# Patient Record
Sex: MALE | Race: BLACK OR AFRICAN AMERICAN | NOT HISPANIC OR LATINO | Employment: FULL TIME | URBAN - METROPOLITAN AREA
[De-identification: names, ages, dates, MRNs, and addresses within clinical notes are randomized per-mention and may not be internally consistent; named-entity substitution may affect disease eponyms.]

---

## 2023-03-13 ENCOUNTER — HOSPITAL ENCOUNTER (EMERGENCY)
Facility: HOSPITAL | Age: 28
Discharge: HOME | End: 2023-03-13
Admitting: PHYSICIAN ASSISTANT
Payer: COMMERCIAL

## 2023-03-13 ENCOUNTER — APPOINTMENT (OUTPATIENT)
Dept: ULTRASOUND IMAGING | Facility: HOSPITAL | Age: 28
End: 2023-03-13
Attending: PHYSICIAN ASSISTANT
Payer: COMMERCIAL

## 2023-03-13 VITALS
TEMPERATURE: 97.6 F | BODY MASS INDEX: 23.43 KG/M2 | DIASTOLIC BLOOD PRESSURE: 80 MMHG | OXYGEN SATURATION: 100 % | WEIGHT: 168 LBS | SYSTOLIC BLOOD PRESSURE: 122 MMHG | RESPIRATION RATE: 16 BRPM | HEART RATE: 51 BPM

## 2023-03-13 DIAGNOSIS — R17 TOTAL BILIRUBIN, ELEVATED: ICD-10-CM

## 2023-03-13 DIAGNOSIS — K29.00 ACUTE GASTRITIS WITHOUT HEMORRHAGE, UNSPECIFIED GASTRITIS TYPE: ICD-10-CM

## 2023-03-13 LAB
ALBUMIN SERPL BCG-MCNC: 4.2 G/DL (ref 3.5–5.2)
ALP SERPL-CCNC: 98 U/L (ref 40–129)
ALT SERPL W P-5'-P-CCNC: 36 U/L (ref 10–50)
ANION GAP SERPL CALCULATED.3IONS-SCNC: 8 MMOL/L (ref 7–15)
AST SERPL W P-5'-P-CCNC: 21 U/L (ref 10–50)
BASOPHILS # BLD MANUAL: 0.2 10E3/UL (ref 0–0.2)
BASOPHILS NFR BLD MANUAL: 2 %
BILIRUB DIRECT SERPL-MCNC: 0.38 MG/DL (ref 0–0.3)
BILIRUB SERPL-MCNC: 2.7 MG/DL
BUN SERPL-MCNC: 7.4 MG/DL (ref 6–20)
CALCIUM SERPL-MCNC: 9 MG/DL (ref 8.6–10)
CHLORIDE SERPL-SCNC: 103 MMOL/L (ref 98–107)
CREAT SERPL-MCNC: 1.18 MG/DL (ref 0.67–1.17)
DEPRECATED HCO3 PLAS-SCNC: 26 MMOL/L (ref 22–29)
EOSINOPHIL # BLD MANUAL: 0.3 10E3/UL (ref 0–0.7)
EOSINOPHIL NFR BLD MANUAL: 3 %
ERYTHROCYTE [DISTWIDTH] IN BLOOD BY AUTOMATED COUNT: 12.7 % (ref 10–15)
GFR SERPL CREATININE-BSD FRML MDRD: 86 ML/MIN/1.73M2
GLUCOSE SERPL-MCNC: 94 MG/DL (ref 70–99)
HCT VFR BLD AUTO: 50.4 % (ref 40–53)
HGB BLD-MCNC: 18.5 G/DL (ref 13.3–17.7)
LIPASE SERPL-CCNC: 31 U/L (ref 13–60)
LYMPHOCYTES # BLD MANUAL: 2.3 10E3/UL (ref 0.8–5.3)
LYMPHOCYTES NFR BLD MANUAL: 25 %
MCH RBC QN AUTO: 33 PG (ref 26.5–33)
MCHC RBC AUTO-ENTMCNC: 36.7 G/DL (ref 31.5–36.5)
MCV RBC AUTO: 90 FL (ref 78–100)
MONOCYTES # BLD MANUAL: 0.4 10E3/UL (ref 0–1.3)
MONOCYTES NFR BLD MANUAL: 4 %
NEUTROPHILS # BLD MANUAL: 6.1 10E3/UL (ref 1.6–8.3)
NEUTROPHILS NFR BLD MANUAL: 66 %
PLAT MORPH BLD: ABNORMAL
PLATELET # BLD AUTO: 231 10E3/UL (ref 150–450)
POTASSIUM SERPL-SCNC: 4.2 MMOL/L (ref 3.4–5.3)
PROT SERPL-MCNC: 7.1 G/DL (ref 6.4–8.3)
RBC # BLD AUTO: 5.6 10E6/UL (ref 4.4–5.9)
RBC MORPH BLD: ABNORMAL
SODIUM SERPL-SCNC: 137 MMOL/L (ref 136–145)
VARIANT LYMPHS BLD QL SMEAR: PRESENT
WBC # BLD AUTO: 9.3 10E3/UL (ref 4–11)

## 2023-03-13 PROCEDURE — 99284 EMERGENCY DEPT VISIT MOD MDM: CPT | Mod: 25

## 2023-03-13 PROCEDURE — 83690 ASSAY OF LIPASE: CPT | Performed by: PHYSICIAN ASSISTANT

## 2023-03-13 PROCEDURE — 250N000013 HC RX MED GY IP 250 OP 250 PS 637: Performed by: PHYSICIAN ASSISTANT

## 2023-03-13 PROCEDURE — 76705 ECHO EXAM OF ABDOMEN: CPT

## 2023-03-13 PROCEDURE — 82248 BILIRUBIN DIRECT: CPT | Performed by: PHYSICIAN ASSISTANT

## 2023-03-13 PROCEDURE — 250N000011 HC RX IP 250 OP 636: Performed by: PHYSICIAN ASSISTANT

## 2023-03-13 PROCEDURE — 85027 COMPLETE CBC AUTOMATED: CPT | Performed by: STUDENT IN AN ORGANIZED HEALTH CARE EDUCATION/TRAINING PROGRAM

## 2023-03-13 PROCEDURE — 250N000009 HC RX 250: Performed by: PHYSICIAN ASSISTANT

## 2023-03-13 PROCEDURE — 36415 COLL VENOUS BLD VENIPUNCTURE: CPT | Performed by: STUDENT IN AN ORGANIZED HEALTH CARE EDUCATION/TRAINING PROGRAM

## 2023-03-13 PROCEDURE — 85007 BL SMEAR W/DIFF WBC COUNT: CPT | Performed by: STUDENT IN AN ORGANIZED HEALTH CARE EDUCATION/TRAINING PROGRAM

## 2023-03-13 RX ORDER — ONDANSETRON 2 MG/ML
4 INJECTION INTRAMUSCULAR; INTRAVENOUS ONCE
Status: DISCONTINUED | OUTPATIENT
Start: 2023-03-13 | End: 2023-03-13

## 2023-03-13 RX ORDER — ONDANSETRON 4 MG/1
4 TABLET, ORALLY DISINTEGRATING ORAL ONCE
Status: COMPLETED | OUTPATIENT
Start: 2023-03-13 | End: 2023-03-13

## 2023-03-13 RX ADMIN — ONDANSETRON 4 MG: 4 TABLET, ORALLY DISINTEGRATING ORAL at 11:55

## 2023-03-13 RX ADMIN — LIDOCAINE HYDROCHLORIDE 30 ML: 20 SOLUTION ORAL; TOPICAL at 11:56

## 2023-03-13 ASSESSMENT — ENCOUNTER SYMPTOMS
FEVER: 0
VOMITING: 0
NAUSEA: 0
DIARRHEA: 1
ABDOMINAL PAIN: 1

## 2023-03-13 NOTE — ED PROVIDER NOTES
EMERGENCY DEPARTMENT ENCOUNTER      NAME: Mirian Turcios  AGE: 27 year old male  YOB: 1995  MRN: [id number]  EVALUATION DATE & TIME: No admission date for patient encounter.    PCP: No Ref-Primary, Physician    ED PROVIDER: Kwaku Santiago PA-C      Chief Complaint   Patient presents with     Abdominal Pain       FINAL IMPRESSION:  1. Acute gastritis without hemorrhage, unspecified gastritis type    2. Total bilirubin, elevated        ED COURSE & MEDICAL DECISION MAKING:    Pertinent Labs & Imaging studies reviewed. (See chart for details)  11:35 AM I met the patient and performed my initial interview and exam.   12:26 PM Patient updated on laboratory results, will proceed with US RUQ.   1:58 PM I rechecked the patient and updated them on laboratory and imaging results.    27 year old male presents to the Emergency Department for evaluation of epigastric abdominal pain.     ED Course as of 03/13/23 1529   Mon Mar 13, 2023   1143 Patient is a 27-year-old male, presents emergency department with no significant past medical history for 2 weeks of intermittent abdominal pain.  Patient notes that initially started 2 weeks ago with some diarrhea which is since stopped.  He had normal bowel movements since.  Notes that he has had some intermittent nausea, as well as burning epigastric pain.  He does not take any medications at home for this.  Notes that he is not nauseous at this time.  No fevers.  No dysuria or hematuria.  No urinary symptoms.  Abdomen is soft and nontender, no focal tenderness on examination here.  He is not tachycardic or tachypneic, otherwise nontoxic-appearing.  Examination here is otherwise unremarkable, no CVA tenderness, or other acute abnormalities.  He is not tachycardic or tachypneic, no chest pain or shortness of breath.  No recent long travel, has otherwise healthy, has no other significant past medical history.  Patient notes that the symptoms are somewhat increased when he  lays down at night, consistent with possible GERD or gastritis.  He has never been worked up for gastritis previously.  We will obtain basic laboratory studies at this time to ensure that there is no acute abnormality, additionally we will give him some Zofran, and some GI cocktail here to see if this helps with his symptoms.  Suspect that he will likely need to follow-up with GI regarding abdominal pain, and GERD.   1355 Abdomen US, limited (RUQ only)  Ultrasound here shows hepatic steatosis and mild fatty megaly, however normal gallbladder and bile ducts, no cholelithiasis or biliary duct dilation.  Otherwise normal ultrasound of the right upper quadrant.  Given this, will recommend the patient follows up with GI, consult placed for epigastric abdominal pain, however suspect gastritis or GERD at this point.   1400 Was seen and updated on imaging results, laboratory studies.  We will have him follow-up with primary care.  Negative ultrasound here, no evidence of choledocholithiasis, cholecystitis, or other gallbladder infection.  He is otherwise nontoxic-appearing.  Abnormal lab with elevated T. bili, will have him follow-up with primary care.  Recommend a low bit of omeprazole for symptom management, suspect this likely gastritis.  Patient is agreeable with this plan.  Discussed return precautions, patient expressed understanding.  Plan for discharge at this time.       Medical Decision Making    History:    Supplemental history from: Documented in chart, if applicable    External Record(s) reviewed: Documented in chart, if applicable.    Work Up:    Chart documentation includes differential considered and any EKGs or imaging independently interpreted by provider, where specified.    In additional to work up documented, I considered the following work up: Documented in chart, if applicable.    External consultation:    Discussion of management with another provider: Documented in chart, if  "applicable    Complicating factors:    Care impacted by chronic illness: Mental Health    Care affected by social determinants of health: N/A    Disposition considerations: Discharge. No recommendations on prescription strength medication(s). N/A.       At the conclusion of the encounter I discussed the results of all of the tests and the disposition. The questions were answered. The patient or family acknowledged understanding and was agreeable with the care plan.     0 minutes of critical care time     MEDICATIONS GIVEN IN THE EMERGENCY:  Medications   ondansetron (ZOFRAN ODT) ODT tab 4 mg (4 mg Oral $Given 3/13/23 1501)   lidocaine (viscous) (XYLOCAINE) 2 % 15 mL, alum & mag hydroxide-simethicone (MAALOX) 15 mL GI Cocktail (30 mLs Oral $Given 3/13/23 1156)       NEW PRESCRIPTIONS STARTED AT TODAY'S ER VISIT  New Prescriptions    No medications on file          =================================================================    HPI    Patient information was obtained from: Patient     Use of : N/A         Mirian Turcios is a 27 year old male with a pertinent history of anxiety who presents to this ED via walk in for evaluation of abdominal pain.    Patient reports intermittent mid abdominal pain that started 2 weeks ago. The pain is sharp, burning and has been getting worse which prompted him to present to the ED for further evaluation. The pain is worse when lying down and also \"comes back stronger after it goes away\". He's never had this pain before and notes that it's been waking him up from his sleep. States that he had an episode of diarrhea 2 weeks ago. Patient denies any urinary symptoms or any recent bowel changes. Denies any fever, nausea, vomiting, rash or chest pain.      REVIEW OF SYSTEMS   Review of Systems   Constitutional: Negative for fever.   Cardiovascular: Negative for chest pain.   Gastrointestinal: Positive for abdominal pain and diarrhea. Negative for nausea and vomiting.   Skin: " Negative for rash.      PAST MEDICAL HISTORY:  Past Medical History:   Diagnosis Date     Anxiety        PAST SURGICAL HISTORY:  No past surgical history on file.        CURRENT MEDICATIONS:    hydrOXYzine (ATARAX) 25 MG tablet  mirtazapine (REMERON) 15 MG tablet         ALLERGIES:  Allergies   Allergen Reactions     Tree Nuts [Nuts] Swelling     Throat swelling       FAMILY HISTORY:  Family History   Problem Relation Age of Onset     Diabetes Mother      Diabetes Father        SOCIAL HISTORY:   Social History     Socioeconomic History     Marital status: Single   Tobacco Use     Smoking status: Some Days   Substance and Sexual Activity     Alcohol use: No     Drug use: Yes     Types: Marijuana       VITALS:  BP (!) 147/83   Pulse 61   Temp 97.6  F (36.4  C) (Oral)   Resp 16   Wt 76.2 kg (168 lb)   SpO2 99%   BMI 23.43 kg/m      PHYSICAL EXAM    Physical Exam  Vitals and nursing note reviewed.   Constitutional:       General: He is not in acute distress.     Appearance: Normal appearance. He is normal weight. He is not toxic-appearing or diaphoretic.   HENT:      Right Ear: External ear normal.      Left Ear: External ear normal.   Eyes:      Conjunctiva/sclera: Conjunctivae normal.   Cardiovascular:      Rate and Rhythm: Normal rate and regular rhythm.   Pulmonary:      Effort: Pulmonary effort is normal. No respiratory distress.      Breath sounds: No wheezing.   Abdominal:      General: Abdomen is flat. There is no distension. There are no signs of injury.      Palpations: Abdomen is soft. There is no fluid wave.      Tenderness: There is no abdominal tenderness. There is no right CVA tenderness, left CVA tenderness, guarding or rebound. Negative signs include Wallace's sign.      Hernia: No hernia is present.   Skin:     Coloration: Skin is not pale.      Findings: No erythema or rash.   Neurological:      General: No focal deficit present.      Mental Status: He is alert. Mental status is at baseline.          LAB:  All pertinent labs reviewed and interpreted.  Labs Ordered and Resulted from Time of ED Arrival to Time of ED Departure   BASIC METABOLIC PANEL - Abnormal       Result Value    Sodium 137      Potassium 4.2      Chloride 103      Carbon Dioxide (CO2) 26      Anion Gap 8      Urea Nitrogen 7.4      Creatinine 1.18 (*)     Calcium 9.0      Glucose 94      GFR Estimate 86     HEPATIC FUNCTION PANEL - Abnormal    Protein Total 7.1      Albumin 4.2      Bilirubin Total 2.7 (*)     Alkaline Phosphatase 98      AST 21      ALT 36      Bilirubin Direct 0.38 (*)    CBC WITH PLATELETS AND DIFFERENTIAL - Abnormal    WBC Count 9.3      RBC Count 5.60      Hemoglobin 18.5 (*)     Hematocrit 50.4      MCV 90      MCH 33.0      MCHC 36.7 (*)     RDW 12.7      Platelet Count 231     DIFFERENTIAL - Abnormal    % Neutrophils 66      % Lymphocytes 25      % Monocytes 4      % Eosinophils 3      % Basophils 2      Absolute Neutrophils 6.1      Absolute Lymphocytes 2.3      Absolute Monocytes 0.4      Absolute Eosinophils 0.3      Absolute Basophils 0.2      RBC Morphology Confirmed RBC Indices      Platelet Assessment        Value: Automated Count Confirmed. Platelet morphology is normal.    Reactive Lymphocytes Present (*)    LIPASE - Normal    Lipase 31         RADIOLOGY:  Reviewed all pertinent imaging. Please see official radiology report.  Abdomen US, limited (RUQ only)   Final Result   IMPRESSION:   1.  Hepatic steatosis and mild hepatomegaly.   2.  Normal gallbladder and bile ducts. No cholelithiasis or biliary ductal dilatation.              PROCEDURES:   None.       Jose MINOR, am serving as a scribe to document services personally performed by Kwaku Santiago PA-C, based on my observation and the provider's statements to me. Kwaku MINOR PA-C, attest that Jose Louie is acting in a scribe capacity, has observed my performance of the services and has documented them in  accordance with my direction.    Kwaku Santiago PA-C  Emergency Medicine  [facility] EMERGENCY DEPARTMENT  [facility] [facility] [id number] [phone]  Dept: [phone]     Kwaku Santiago PA-C  03/13/23 1522

## 2023-03-13 NOTE — ED TRIAGE NOTES
Pt presents to triage ambulatory for abdominal pain. Pt reports generalized abdominal pain started two weeks ago initially with some diarrhea then stopped. Pain started again yesterday worse with movement. +Nausea but no vomiting associated with. Denies fevers or chills. No hx of abd surgeries.

## 2023-03-13 NOTE — DISCHARGE INSTRUCTIONS
You are seen here in the emergency department for evaluation of midepigastric abdominal pain, some nausea and vomiting.  Your ultrasound here of your gallbladder does not show any acute abnormalities.  Your laboratory studies for your liver did show a little bit of elevation, and I recommend that you follow-up with a primary care doctor initially for this.  For your gastritis symptoms, I recommend omeprazole, which is available over-the-counter, once a day, take this 30 minutes prior to eating in the morning, and see if this helps with your reflux symptoms.  If you develop any new or worsening symptoms such as fever, nausea, vomiting, worsening abdominal pain, or abdominal pain that is moving locations, please return to the emergency department.  Otherwise please follow-up with your primary care doctor.    For pain or fever you may use:  -Tylenol 650 mg every 6 hours.  Max 4000 mg in 24 hours  Do not use thismedication with alcohol as it can cause liver problems.  -Ibuprofen 600 mg every 6 hours.  Max 3500 mg in 24 hours  Do not take this medication if you have a history of a GI bleed or have kidney problems.  You may use both of these medications at the same time or you can alternate them every 3 hours.  For example, Tylenol at 6 AM, ibuprofen at 9 AM, Tylenol at noon, etc.

## 2023-03-31 ENCOUNTER — OFFICE VISIT (OUTPATIENT)
Dept: FAMILY MEDICINE | Facility: CLINIC | Age: 28
End: 2023-03-31
Payer: COMMERCIAL

## 2023-03-31 VITALS
BODY MASS INDEX: 25.62 KG/M2 | WEIGHT: 179 LBS | HEART RATE: 52 BPM | SYSTOLIC BLOOD PRESSURE: 118 MMHG | DIASTOLIC BLOOD PRESSURE: 78 MMHG | TEMPERATURE: 98.4 F | OXYGEN SATURATION: 100 % | HEIGHT: 70 IN

## 2023-03-31 DIAGNOSIS — K76.0 HEPATIC STEATOSIS: ICD-10-CM

## 2023-03-31 DIAGNOSIS — R17 TOTAL BILIRUBIN, ELEVATED: ICD-10-CM

## 2023-03-31 DIAGNOSIS — K29.00 ACUTE GASTRITIS WITHOUT HEMORRHAGE, UNSPECIFIED GASTRITIS TYPE: Primary | ICD-10-CM

## 2023-03-31 DIAGNOSIS — R21 RASH AND NONSPECIFIC SKIN ERUPTION: ICD-10-CM

## 2023-03-31 LAB
ALBUMIN SERPL BCG-MCNC: 4.5 G/DL (ref 3.5–5.2)
ALP SERPL-CCNC: 82 U/L (ref 40–129)
ALT SERPL W P-5'-P-CCNC: 29 U/L (ref 10–50)
AST SERPL W P-5'-P-CCNC: 25 U/L (ref 10–50)
BILIRUB DIRECT SERPL-MCNC: 0.41 MG/DL (ref 0–0.3)
BILIRUB SERPL-MCNC: 3.2 MG/DL
PROT SERPL-MCNC: 7.2 G/DL (ref 6.4–8.3)

## 2023-03-31 PROCEDURE — 80076 HEPATIC FUNCTION PANEL: CPT | Performed by: STUDENT IN AN ORGANIZED HEALTH CARE EDUCATION/TRAINING PROGRAM

## 2023-03-31 PROCEDURE — 99203 OFFICE O/P NEW LOW 30 MIN: CPT | Performed by: STUDENT IN AN ORGANIZED HEALTH CARE EDUCATION/TRAINING PROGRAM

## 2023-03-31 PROCEDURE — 36415 COLL VENOUS BLD VENIPUNCTURE: CPT | Performed by: STUDENT IN AN ORGANIZED HEALTH CARE EDUCATION/TRAINING PROGRAM

## 2023-03-31 RX ORDER — FAMOTIDINE 20 MG/1
20 TABLET, FILM COATED ORAL 2 TIMES DAILY PRN
Qty: 60 TABLET | Refills: 1 | Status: SHIPPED | OUTPATIENT
Start: 2023-03-31

## 2023-03-31 NOTE — PROGRESS NOTES
Assessment & Plan     Acute gastritis without hemorrhage, unspecified gastritis type  Was seen in ED for this. Symptoms now resolved. Completed 14 days course of omeprazole. No recurrence since stopping PPI. Discussed trigger foods and lifestyle modifications. Suspect gastritits vs GERD secondary to protein drinks and spicy foods. We will have him use pepcid as needed for recurrence of symptoms. If symptoms recur frequently or more severe again then we will do H pylori testing. Recheck liver enzymes   - Primary Care Referral  - Hepatic panel (Albumin, ALT, AST, Bili, Alk Phos, TP); Future  - famotidine (PEPCID) 20 MG tablet; Take 1 tablet (20 mg) by mouth 2 times daily as needed (heartburn)  - Hepatic panel (Albumin, ALT, AST, Bili, Alk Phos, TP)    Total bilirubin, elevated  Recheck liver enzymes   - Primary Care Referral    Hepatic steatosis  Noted on RUQ US in the ED. Doesn't drink alcohol or use drugs. Reviewed diet and overall fairly healthy. BMI slightly overweight. Recheck liver enzymes and monitor with repeat US in 1 year     Rash and nonspecific skin eruption  He will stop using cortisone cream and we will do a punch biopsy in 2 weeks       Alice Cramer, DO  [facility]          Magda Vela is a 28 year old, presenting for the following health issues:  ER F/U    Additional Questions 3/31/2023   Accompanied by NA     History of Present Illness       Reason for visit:  Follow up    He eats 2-3 servings of fruits and vegetables daily.He consumes 1 sweetened beverage(s) daily.He exercises with enough effort to increase his heart rate 30 to 60 minutes per day.  He exercises with enough effort to increase his heart rate 4 days per week.   He is taking medications regularly.       ED/UC Followup:    Facility:  [facility] Emergency Department  Date of visit: 03/13/2023  Reason for visit: intermittent abdominal pain  Current Status: Better    Took 14  "days of omeprazole and symptoms resolved after 2-3 days. Only nausea once on the day he went to the ED. Typically epigastric pain for the 1-2 weeks before the ED. No further symptoms    Was taking a lot of protein shakes.   Not frequent NSAIDs   Frequent spicy foods. Often burping after   No coffee. Tea.   No EtOH use, no smoking or drug use    Exercises daily after work, treadmill jogs 75 min and lifts weight.     Mainly cooks at home. Tuna, tilapia. 1-2 sprites/day.   Not a lot of chips/sweets/burgers.     RUQ US 3/+  IMPRESSION:  1.  Hepatic steatosis and mild hepatomegaly.  2.  Normal gallbladder and bile ducts. No cholelithiasis or biliary ductal dilatation.    Derm-  Dry patch on right LE  Uses hydrocortisone OTC 1% as needed  when he doesn't use it after a few days it get bigger  Started July 2022  Has had this once before in the past on his right shin  Itches           Review of Systems   Constitutional, HEENT, cardiovascular, pulmonary, gi and gu systems are negative, except as otherwise noted.      Objective    /78 (BP Location: Right arm, Patient Position: Sitting, Cuff Size: Adult Regular)   Pulse 52   Temp 98.4  F (36.9  C) (Oral)   Ht 1.778 m (5' 10\")   Wt 81.2 kg (179 lb)   SpO2 100%   BMI 25.68 kg/m    Body mass index is 25.68 kg/m .  Physical Exam   GENERAL: healthy, alert and no distress  EYES: Eyes grossly normal to inspection, PERRL and conjunctivae and sclerae normal  HENT: ear canals and TM's normal, nose and mouth without ulcers or lesions  NECK: no adenopathy, no asymmetry, masses, or scars and thyroid normal to palpation  RESP: lungs clear to auscultation - no rales, rhonchi or wheezes  CV: regular rate and rhythm, normal S1 S2, no S3 or S4, no murmur, click or rub, no peripheral edema and peripheral pulses strong  ABDOMEN: soft, nontender, no hepatosplenomegaly, no masses and bowel sounds normal  SKIN:  RLE proximal shin with slightly hyperkeratocic hyperpigmented lesion with " surround area of more mildl hyperpigmented skin

## 2023-04-19 ENCOUNTER — OFFICE VISIT (OUTPATIENT)
Dept: FAMILY MEDICINE | Facility: CLINIC | Age: 28
End: 2023-04-19
Payer: COMMERCIAL

## 2023-04-19 VITALS
HEIGHT: 70 IN | BODY MASS INDEX: 24.77 KG/M2 | HEART RATE: 45 BPM | OXYGEN SATURATION: 100 % | DIASTOLIC BLOOD PRESSURE: 67 MMHG | WEIGHT: 173 LBS | SYSTOLIC BLOOD PRESSURE: 117 MMHG | TEMPERATURE: 97.6 F

## 2023-04-19 DIAGNOSIS — K29.00 ACUTE GASTRITIS WITHOUT HEMORRHAGE, UNSPECIFIED GASTRITIS TYPE: ICD-10-CM

## 2023-04-19 DIAGNOSIS — R17 TOTAL BILIRUBIN, ELEVATED: ICD-10-CM

## 2023-04-19 DIAGNOSIS — R21 RASH AND NONSPECIFIC SKIN ERUPTION: Primary | ICD-10-CM

## 2023-04-19 PROCEDURE — 11104 PUNCH BX SKIN SINGLE LESION: CPT | Performed by: STUDENT IN AN ORGANIZED HEALTH CARE EDUCATION/TRAINING PROGRAM

## 2023-04-19 PROCEDURE — 90715 TDAP VACCINE 7 YRS/> IM: CPT | Performed by: STUDENT IN AN ORGANIZED HEALTH CARE EDUCATION/TRAINING PROGRAM

## 2023-04-19 PROCEDURE — 90471 IMMUNIZATION ADMIN: CPT | Performed by: STUDENT IN AN ORGANIZED HEALTH CARE EDUCATION/TRAINING PROGRAM

## 2023-04-19 PROCEDURE — 88312 SPECIAL STAINS GROUP 1: CPT | Performed by: DERMATOLOGY

## 2023-04-19 PROCEDURE — 99213 OFFICE O/P EST LOW 20 MIN: CPT | Mod: 25 | Performed by: STUDENT IN AN ORGANIZED HEALTH CARE EDUCATION/TRAINING PROGRAM

## 2023-04-19 PROCEDURE — 88305 TISSUE EXAM BY PATHOLOGIST: CPT | Performed by: DERMATOLOGY

## 2023-04-19 RX ORDER — TRIAMCINOLONE ACETONIDE 1 MG/G
CREAM TOPICAL 2 TIMES DAILY
Qty: 45 G | Refills: 0 | Status: SHIPPED | OUTPATIENT
Start: 2023-04-19 | End: 2023-09-29

## 2023-04-19 NOTE — PROGRESS NOTES
Assessment & Plan     Rash and nonspecific skin eruption  Patient tolerated procedure well. Path pending.   - triamcinolone (KENALOG) 0.1 % external cream; Apply topically 2 times daily  - Surgical Pathology Exam    Total bilirubin, elevated  Recheck bili in 1 month. Indirect bilirubin elevated. Suspect gilbert  - Hepatic panel (Albumin, ALT, AST, Bili, Alk Phos, TP); Future    Acute gastritis without hemorrhage, unspecified gastritis type  Symptoms have improved since starting Pepcid.  We will hold off on H. pylori testing.        [facility] Cramer [facility] is a 28 year old, presenting for the following health issues:  Procedure        4/19/2023     3:56 PM   Additional Questions   Accompanied by NA         4/19/2023     3:56 PM   Patient Reported Additional Medications   Patient reports taking the following new medications None     History of Present Illness       Reason for visit:  Follow up for leg infection    He eats 4 or more servings of fruits and vegetables daily.He consumes 1 sweetened beverage(s) daily.He exercises with enough effort to increase his heart rate 60 or more minutes per day.  He exercises with enough effort to increase his heart rate 4 days per week.   He is taking medications regularly.     Right leg-  Stopped using hydrocortisone 1% cream after last visit 2 weeks ago. Has been getting worse. Here for bx      GI-  Much better with Famotidine. Prn   Bilirubin was also still elevated.  Does not endorse increased stress recently.  No abdominal pain.  Had a normal ultrasound of his right upper quadrant except for hepatic steatosis.          Review of Systems   Constitutional, HEENT, cardiovascular, pulmonary, gi and gu systems are negative, except as otherwise noted.      Objective    /67 (BP Location: Right arm, Patient Position: Sitting, Cuff Size: Adult Regular)   Pulse (!) 45   Temp 97.6  F (36.4  C) (Tympanic)   Ht 1.778 m (5'  "10\")   Wt 78.5 kg (173 lb)   SpO2 100%   BMI 24.82 kg/m    Body mass index is 24.82 kg/m .  Physical Exam   GENERAL: healthy, alert and no distress  SKIN: 3cm x 2 cm area on right lateral shin with violaceous base and desquamated soft central are   PSYCH: mentation appears normal, affect normal/bright      Procedure:  Consent obtained.  Area cleansed with iodine x3.  0.5 mL of 2% lidocaine with epi injected into the lateral area of the lesion.  A 3 mm punch biopsy was performed.  Hemostasis was achieved with silver nitrate and placed 3.  Patient tolerated the procedure well.  Bacitracin and bandage applied.  Instructions given regarding follow-up.             "

## 2023-04-25 LAB
PATH REPORT.COMMENTS IMP SPEC: NORMAL
PATH REPORT.FINAL DX SPEC: NORMAL
PATH REPORT.GROSS SPEC: NORMAL
PATH REPORT.MICROSCOPIC SPEC OTHER STN: NORMAL
PATH REPORT.RELEVANT HX SPEC: NORMAL
PHOTO IMAGE: NORMAL

## 2023-04-30 ENCOUNTER — HEALTH MAINTENANCE LETTER (OUTPATIENT)
Age: 28
End: 2023-04-30

## 2023-09-29 DIAGNOSIS — R21 RASH AND NONSPECIFIC SKIN ERUPTION: ICD-10-CM

## 2023-09-29 RX ORDER — TRIAMCINOLONE ACETONIDE 1 MG/G
CREAM TOPICAL 2 TIMES DAILY
Qty: 45 G | Refills: 0 | Status: SHIPPED | OUTPATIENT
Start: 2023-09-29 | End: 2024-02-26

## 2024-02-26 DIAGNOSIS — R21 RASH AND NONSPECIFIC SKIN ERUPTION: ICD-10-CM

## 2024-02-26 RX ORDER — TRIAMCINOLONE ACETONIDE 1 MG/G
CREAM TOPICAL 2 TIMES DAILY
Qty: 45 G | Refills: 0 | Status: SHIPPED | OUTPATIENT
Start: 2024-02-26

## 2024-07-07 ENCOUNTER — HEALTH MAINTENANCE LETTER (OUTPATIENT)
Age: 29
End: 2024-07-07

## 2025-07-19 ENCOUNTER — HEALTH MAINTENANCE LETTER (OUTPATIENT)
Age: 30
End: 2025-07-19